# Patient Record
(demographics unavailable — no encounter records)

---

## 2024-11-18 NOTE — HISTORY OF PRESENT ILLNESS
[FreeTextEntry6] : patient is here today because he has had some chest wall pain b/l  , anteriorly and posteriorly , on and off for a few weeks.   there has been no n/v/d/fever or rash. he has been quite anxious lately. he is on fluoxetine 20 mg daily  ,prescribed by his psychiatrist.

## 2024-11-18 NOTE — PHYSICAL EXAM
[NL] : warm, clear [Straight] : not straight [de-identified] : stiffness in back [de-identified] : kyphosis/ poor posture

## 2025-06-26 NOTE — DISCUSSION/SUMMARY
[FreeTextEntry1] : Recommend antibiotics, nasal saline, and mucinex. Return if symptoms worsen or persist. sore throat - rapid strep negative/ culture pending

## 2025-06-26 NOTE — PHYSICAL EXAM
[Mucoid Discharge] : mucoid discharge [Inflamed Nasal Mucosa] : inflamed nasal mucosa [Hypertrophied Nasal Mucosa] : hypertrophied nasal mucosa [Erythematous Oropharynx] : erythematous oropharynx [NL] : warm, clear

## 2025-06-26 NOTE — HISTORY OF PRESENT ILLNESS
[FreeTextEntry6] : patient is here today because he has had a cold and sore throat for the last several days.  there has been no n/v/d/rash or fever.  he has taken sienna seltzer and tylenol.